# Patient Record
Sex: MALE | Race: BLACK OR AFRICAN AMERICAN | URBAN - METROPOLITAN AREA
[De-identification: names, ages, dates, MRNs, and addresses within clinical notes are randomized per-mention and may not be internally consistent; named-entity substitution may affect disease eponyms.]

---

## 2019-09-21 ENCOUNTER — OFFICE VISIT (OUTPATIENT)
Dept: OTOLARYNGOLOGY | Facility: CLINIC | Age: 54
End: 2019-09-21
Payer: COMMERCIAL

## 2019-09-21 VITALS
SYSTOLIC BLOOD PRESSURE: 154 MMHG | HEIGHT: 74 IN | DIASTOLIC BLOOD PRESSURE: 114 MMHG | BODY MASS INDEX: 35.94 KG/M2 | TEMPERATURE: 98 F | WEIGHT: 280 LBS

## 2019-09-21 DIAGNOSIS — H61.23 BILATERAL IMPACTED CERUMEN: Primary | ICD-10-CM

## 2019-09-21 PROCEDURE — 99203 OFFICE O/P NEW LOW 30 MIN: CPT | Performed by: OTOLARYNGOLOGY

## 2019-09-21 NOTE — PROGRESS NOTES
Dexter Newman is a 47year old male. Patient presents with:  Ear Problem: Ear cleaning        HISTORY OF PRESENT ILLNESS  9/21/2019   Here for evaluation of   hearing loss.  Patient feels this has worsened over the las months and  is not  associated with tinn SYSTEMS    System Neg/Pos Details   Constitutional Negative fever, weight loss. ENMT Negative Headaches vertigo    Eyes Negative Blurred vision and vision changes. Respiratory Negative Dyspnea and wheezing.    Cardio Negative Chest pain, irregular heart baseline after wax was removed      Karla Dandy, MD    9/21/2019    9:21 AM

## (undated) NOTE — LETTER
9/21/2019              Angy Gambino        [address]        [address] [facility] [id number]         To Whom it may concern:     This is to certify that Angy Gambino had an appointment on 9/21/2019 at 9:47 AM with Randy Lindquist MD.        Sincerely,    Randy Lindquist,